# Patient Record
Sex: MALE | Race: ASIAN | NOT HISPANIC OR LATINO | Employment: FULL TIME | URBAN - METROPOLITAN AREA
[De-identification: names, ages, dates, MRNs, and addresses within clinical notes are randomized per-mention and may not be internally consistent; named-entity substitution may affect disease eponyms.]

---

## 2019-11-18 ENCOUNTER — RECORDS - HEALTHEAST (OUTPATIENT)
Dept: GENERAL RADIOLOGY | Facility: CLINIC | Age: 24
End: 2019-11-18

## 2019-11-18 ENCOUNTER — OFFICE VISIT - HEALTHEAST (OUTPATIENT)
Dept: FAMILY MEDICINE | Facility: CLINIC | Age: 24
End: 2019-11-18

## 2019-11-18 DIAGNOSIS — Z71.6 ENCOUNTER FOR TOBACCO USE CESSATION COUNSELING: ICD-10-CM

## 2019-11-18 DIAGNOSIS — M54.50 LOW BACK PAIN: ICD-10-CM

## 2019-11-18 DIAGNOSIS — M54.50 LOW BACK PAIN, UNSPECIFIED BACK PAIN LATERALITY, UNSPECIFIED CHRONICITY, UNSPECIFIED WHETHER SCIATICA PRESENT: ICD-10-CM

## 2019-11-18 ASSESSMENT — MIFFLIN-ST. JEOR: SCORE: 1480.68

## 2019-12-04 ENCOUNTER — OFFICE VISIT - HEALTHEAST (OUTPATIENT)
Dept: PHYSICAL THERAPY | Facility: REHABILITATION | Age: 24
End: 2019-12-04

## 2019-12-04 DIAGNOSIS — R29.3 POOR POSTURE: ICD-10-CM

## 2019-12-04 DIAGNOSIS — M62.81 MUSCLE WEAKNESS (GENERALIZED): ICD-10-CM

## 2019-12-04 DIAGNOSIS — G89.29 CHRONIC BILATERAL LOW BACK PAIN WITHOUT SCIATICA: ICD-10-CM

## 2019-12-04 DIAGNOSIS — M54.50 CHRONIC BILATERAL LOW BACK PAIN WITHOUT SCIATICA: ICD-10-CM

## 2019-12-18 ENCOUNTER — OFFICE VISIT - HEALTHEAST (OUTPATIENT)
Dept: PHYSICAL THERAPY | Facility: REHABILITATION | Age: 24
End: 2019-12-18

## 2019-12-18 DIAGNOSIS — M62.81 MUSCLE WEAKNESS (GENERALIZED): ICD-10-CM

## 2019-12-18 DIAGNOSIS — R29.3 POOR POSTURE: ICD-10-CM

## 2019-12-18 DIAGNOSIS — M54.50 CHRONIC BILATERAL LOW BACK PAIN WITHOUT SCIATICA: ICD-10-CM

## 2019-12-18 DIAGNOSIS — G89.29 CHRONIC BILATERAL LOW BACK PAIN WITHOUT SCIATICA: ICD-10-CM

## 2021-06-03 NOTE — PROGRESS NOTES
"Subjective: This patient comes in for the first time to the clinic.    He had been seen at [facility] years ago.    Came to [place] in August 2015, to [place].  From [place].    Past medical history no surgeries or hospitalizations.  Denies any medical issues    He has no allergies is not on any medicines has not tried anything over-the-counter for his back pain please see below    He smokes 3 to 4 cigarettes a day alcohol about 3 drinks per week.    He works on a forklift.  Denies any injuries at work he is  has 3 kids    The symptoms been going on in his lower back in the paraspinal muscles for about 6 to 7 months he does not have any leg symptoms denies any saddle anesthesia or urinary or bowel incontinence.    Is a first time he is been evaluated for this.  He is not been doing any exercises.    Tobacco status: He  reports that he has been smoking cigarettes. He has never used smokeless tobacco.    There are no active problems to display for this patient.      Current Outpatient Medications   Medication Sig Dispense Refill     naproxen (NAPROSYN) 375 MG tablet 1 po two times a day prn back pain 40 tablet 1     No current facility-administered medications for this visit.        ROS: 10 point review of systems positive as outlined above otherwise negative    Objective:    /72 (Patient Site: Right Arm, Patient Position: Sitting, Cuff Size: Adult Regular)   Pulse 81   Temp 97.5  F (36.4  C) (Oral)   Resp 16   Ht 5' 4\" (1.626 m)   Wt 130 lb (59 kg)   SpO2 98%   BMI 22.31 kg/m    Body mass index is 22.31 kg/m .    General appearance no acute distress    Vital signs are stable  HEENT negative cervical neck without tenderness no adenopathy  Lungs are clear no rales or rhonchi heart was regular S1-S2 rate at 80 O2 sat 98%    Abdomen nontender no guarding or rebound no flank pain.    Lower back with paraspinal tenderness, no radicular component no pain in through " the SI joints or sciatic notch.  Negative straight leg raising.    Upper and lower extremities had slightly brisk reflexes but symmetric.    No muscle wasting.    X-ray lumbar spine was normal other than some straightening of the normal curvature    No results found for this or any previous visit.    Assessment:  1. Low back pain, unspecified back pain laterality, unspecified chronicity, unspecified whether sciatica present  naproxen (NAPROSYN) 375 MG tablet    XR Lumbar Spine 2 or 3 VWS    Ambulatory referral to PT/OT   2. Encounter for tobacco use cessation counseling       Low back pain now for 7 months.    We will have him go to physical therapy also use naproxen 3 and 75 mg twice daily    Discussed Jonn exercise stretching.  As well.    Follow-up if any persistent symptoms after therapy    Plan: As outlined above    Also encouraged to quit smoking    This transcription uses voice recognition software, which may contain typographical errors.

## 2021-06-04 VITALS
SYSTOLIC BLOOD PRESSURE: 110 MMHG | HEART RATE: 81 BPM | HEIGHT: 64 IN | OXYGEN SATURATION: 98 % | TEMPERATURE: 97.5 F | WEIGHT: 130 LBS | DIASTOLIC BLOOD PRESSURE: 72 MMHG | RESPIRATION RATE: 16 BRPM | BODY MASS INDEX: 22.2 KG/M2

## 2021-06-04 NOTE — PROGRESS NOTES
[facility] Rehabilitation Certification Request    December 4, 2019      Patient: Law Umberto Pascual  MR Number: [id number]  YOB: 1995  Date of Visit: 12/4/2019      Dear Dr. Mccarty, Glenn Coats MD:    Thank you for this referral.   We are seeing Law Pascual in Physical Therapy for low back pain.    Medicare and/or Medicaid requires physician review and approval of the treatment plan. Please review the plan of care and verify that you agree with the therapy plan of care by co-signing this note.      Plan of Care  Authorization / Certification Start Date: 12/04/19  Authorization / Certification End Date: 03/03/20  Authorization / Certification Number of Visits: 8  Communication with: Referral Source  Patient Related Instruction: Nature of Condition;Treatment plan and rationale;Self Care instruction;Basis of treatment;Body mechanics  Times per Week: 1 to every other week  Number of Weeks: 12  Number of Visits: 4-8  Discharge Planning: Patient will be discharged when independent in self-management of condition or when goals are met.  Precautions / Restrictions : None  Therapeutic Exercise: ROM;Stretching;Strengthening  Neuromuscular Reeducation: kinesio tape;posture;core  Manual Therapy: soft tissue mobilization;myofascial release;joint mobilization;muscle energy;strain counterstrain  Modalities: electrical stimulation;ultrasound      Goals:  Pt. 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    Sincerely,      Echo Smith, PT, DPT        Physician recommendation:     ___ Follow therapist's recommendation        ___ Modify therapy      *Physician co-signature indicates they certify the need for these services furnished within this plan and while under their  [facility] Rehabilitation   [place]-Pelvic Initial Evaluation    Patient Name: Law Umberto Pascual  Date of evaluation: 12/4/2019  Referral Diagnosis: Low back pain, unspecified back pain laterality, unspecified chronicity, unspecified whether sciatica present  Referring provider: Glenn Mccaryt MD  Visit Diagnosis:     ICD-10-CM    1. Chronic bilateral low back pain without sciatica M54.5     G89.29    2. Muscle weakness (generalized) M62.81    3. Poor posture R29.3        Assessment:        Law Umberto Pascual is a 23 y.o. male who presents to therapy today with chief complaints of chronic lower back pain. Onset date of sx was 03/2019.  Pt reported h/o no known injury, but has had intermittent lower back pain in the past.  Pain symptoms are achy in nature.  Functional impairments include sitting and driving the forklift at work.  Pt demo's signs and sx consistent with chronic LBP likely d/t impaired mm strength and flexibility.   Pt. is appropriate for skilled PT intervention as outlined in the Plan of Care (POC).  Pt. is a good candidate for skilled PT services to improve pain levels and function.    Goals:  Pt. 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    Barriers to Learning or Achieving Goals:  Language barriers.       Plan / Patient Instructions:        Plan of Care:   Authorization / Certification Start Date: 12/04/19  Authorization / Certification End Date: 03/03/20  Authorization / Certification Number of Visits: 8  Communication with: Referral Source  Patient Related Instruction: Nature of Condition;Treatment plan and rationale;Self Care instruction;Basis of treatment;Body mechanics  Times per Week: 1 to every other week  Number of Weeks: 12  Number of Visits: 4-8  Discharge Planning:  Patient will be discharged when independent in self-management of condition or when goals are met.  Precautions / Restrictions : None  Therapeutic Exercise: ROM;Stretching;Strengthening  Neuromuscular Reeducation: kinesio tape;posture;core  Manual Therapy: soft tissue mobilization;myofascial release;joint mobilization;muscle energy;strain counterstrain  Modalities: electrical stimulation;ultrasound      POC and pathology of condition were reviewed with patient.  Pt. is in agreement with the Plan of Care  A Home Exercise Program (HEP) was initiated today.  Pt. was instructed in exercises by PT and patient was given a handout with detailed instructions.    Plan for next visit: Review of HEP.  Progress HEP as able.  Assess if KT tape was helpful and re-apply if it was.     Subjective:       The patient reports that his pain started 7-8 months ago.  He has had pain longer than this but it comes and goes.  Sitting can be painful.    Social information:   Living Situation:apartment   Occupation: [employer]   Work Status:Working full time   Equipment Available: None    Pain Ratin  Pain rating at best: 2  Pain rating at worst: 5  Pain description: aching    Functional limitations are described as occurring with:   sitting, driving forklift    Patient reports benefit from:  movement or exercise        Objective:      Note: Items left blank indicates the item was not performed or not indicated at the time of the evaluation.    Examination  1. Chronic bilateral low back pain without sciatica     2. Muscle weakness (generalized)     3. Poor posture       Involved side: Bilateral  Posture Observation:      General standing posture is fair.  Lumbopelvic complex: Mildly increased lumbar lordosis    Lumbar ROM:    Date: 19     *Indicate scale AROM AROM AROM   Lumbar Flexion WNL     Lumbar Extension WNL      Right Left Right Left Right Left   Lumbar Sidebending WNL WNL       Lumbar Rotation WNL WNL       Thoracic Flexion WNL      Thoracic Extension WNL     Thoracic Sidebending WNL WNL       Thoracic Rotation WNL WNL         Lower Extremity Strength:     Date: 12/04/19     LE strength/5 Right Left Right Left Right Left   Hip Flexion (L1-3) 4+ 4+       Hip Extension (L5-S1)         Hip Abduction (L4-5)         Hip Adduction (L2-3)         Hip External Rotation         Hip Internal Rotation         Knee Extension (L3-4) 4+ 4+       Knee Flexion 4 4       Ankle Dorsiflexion (L4-5) 5 5       Great Toe Extension (L5)         Ankle Plantar flexion (S1)         Abdominals        Sensation    WNL per patient    Reflex Testing  Lumbar Dermatomes Right Left UE Reflexes Right Left   Iliac Crest and Groin (L1)   Biceps (C5-6)     Anterior Medial Thigh (L2)   Brachioradialis (C5-6)     Anterior Thigh, Medial Epicondyle Femur (L3)   Triceps (C7-8)     Lateral Thigh, Anterior Knee, Medial Leg/Malleolus (L4)   Lena s test     Lateral Leg, Dorsal Foot (L5)   LE Reflexes     Lateral Foot (S1)   Patellar (L3-4)     Posterior Leg (S2)   Achilles (S1-2)     Other:   Babinski Response       Palpation: Increase muscle tone bilateral lumbar paraspinals    Lumbar Special Tests:     Lumbar Special Tests Right Left SI Tests Right  Left   Quadrant test   SI Compression     Straight leg raise + d/t hamstring tightness + d/t hamstring tightness SI Distraction     Crossover response   POSH Test     Slump   Sacral Thrust - -   Sit-up test  FADIR     Trunk extensor endurance test  Resisted Abduction     Prone instability test  Other:     Pubic shotgun  Other:       Repeated Motion Testing:  Not indicated    Passive Mobility - Joint Integrity:  WNL  Very mild hypomobility L4-5    LE Screen:  WNL    Appt time: 10:31AM - 11:25AM    Treatment Today     TREATMENT MINUTES COMMENTS   Evaluation 26 -Low complexity lumbar evaluation  -Patient educated on pathology  -Discussed POC   Self-care/ Home management     Manual therapy     Neuromuscular Re-education 8 -KT tape to  "lumbar paraspinal mm for inhibition; 2 I strips placed while patient was bent forward slightly  -Explanation of purpose of KT tape and instruction given how to remove tape after 3-5 days of use.   Therapeutic Activity     Therapeutic Exercises 20 -Demo/performance of HEP  Exercise #1: KTC stretch  Comment #1: Double leg x 30 seconds  Exercise #2: Hamstring stretch  Comment #2: Supine x 30 seconds bilaterally  Exercise #3: Ab sets/marching  Comment #3: x 15 bilaterally; cues for keeping pelvis level  Exercise #4: Bridging  Comment #4: x 10 with 5\" hold; cues to lift higher  Exercise #5: Quadruped exercise  Comment #5: Leg extensions x 5 bilaterally     Gait training     Modality__________________                Total 54    Blank areas are intentional and mean the treatment did not include these items.     PT Evaluation Code: (Please list factors)  Patient History/Comorbidities: None  Examination: Impaired ROM d/t mm tightness, increased mm tone d/t weakness lumbar spine  Clinical Presentation: Stable  Clinical Decision Making: Low complexity    Patient History/  Comorbidities Examination  (body structures and functions, activity limitations, and/or participation restrictions) Clinical Presentation Clinical Decision Making (Complexity)   No documented Comorbidities or personal factors 1-2 Elements Stable and/or uncomplicated Low   1-2 documented comorbidities or personal factor 3 Elements Evolving clinical presentation with changing characteristics Moderate   3-4 documented comorbidities or personal factors 4 or more Unstable and unpredictable High            Echo Smith, PT, DPT  12/4/2019  11:47 AM                 "

## 2021-06-04 NOTE — PROGRESS NOTES
"[facility] Daily Progress     Patient Name: Law Umberto Benderh  Date: 2019  Visit #:2  PTA visit #:  1  Referral Diagnosis: Chronic low back pain without sciatica  Referring provider: Yuan Montejo MD  Visit Diagnosis:     ICD-10-CM    1. Chronic bilateral low back pain without sciatica M54.5     G89.29    2. Muscle weakness (generalized) M62.81    3. Poor posture R29.3          Assessment:   Pt returns today for his first follow up appointment.   Pt with decreased pain today.   Pt reporting increased tolerance to work related duties.  Pt presents with poor sitting posture can correct with verbal cuing.  Pt requiring verbal and tactile cues to perform the exercises correctly.  Patient is benefitting from skilled physical therapy and is making steady progress toward functional goals.  Patient is appropriate to continue with skilled physical therapy intervention, as indicated by initial plan of care.    Goal Status:On going  Pt. 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  Progress with home program as tolerated.   Go over HEP. Discussed walking program with pt, taking short walks through out the day.  Progress to [facility].    Subjective:   Pt reports his back is feeling better.  Pt reports has been compliant with his HEP.  Pt does have a gym membership.  Pt not sure if the Kinesiotape helped.   Pain Ratin        Objective:     Exercises:  Exercise #1: KTC stretch  Comment #1: Double leg x 30 seconds  Exercise #2: Hamstring stretch  Comment #2: Supine x 30 seconds bilaterally  Exercise #3: Ab sets/marching  Comment #3: x 15 bilaterally; cues for keeping pelvis level  Exercise #4: Bridging  Comment #4: x 10 with 5\" hold; cues to lift higher  Exercise #5: Quadruped " "exercise  Comment #5: Leg extensions x 5 bilaterally  Exercise #6: clamshell  Comment #6: x10 B to gradually increase to 30 reps  Exercise #7: LTR  Comment #7: 5-10\" holds x 10 reps  Exercise #8: quadraped alternating arm raises  Comment #8: x10 reps         Treatment Today     TREATMENT MINUTES COMMENTS   Evaluation     Self-care/ Home management     Manual therapy     Neuromuscular Re-education     Therapeutic Activity     Therapeutic Exercises 30 Nustep WL 5 x 5'  See flow sheet or above  Added to HEP:  -LTR  -genie  -quadraped arm raises   Gait training     Modality__________________                Total 30    Blank areas are intentional and mean the treatment did not include these items.       Love Valenzuela,ISIDRO  12/18/2019    "

## 2021-06-06 NOTE — PROGRESS NOTES
[facility] Rehabilitation Discharge Summary  Patient Name: Law Shipman Samara  Date: 3/17/2020  Referral Diagnosis: Low back pain, unspecified back pain laterality, unspecified chronicity, unspecified whether sciatica present  Referring provider: Glenn Mccarty MD  Visit Diagnosis:     ICD-10-CM    1. Chronic bilateral low back pain without sciatica  M54.5     G89.29    2. Muscle weakness (generalized)  M62.81    3. Poor posture  R29.3        Goals:  Pt. 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  The patient attended therapy initially, but did not finish the therapy sessions prescribed.  Goals were not fully achieved. Explanation for goals not achieved: Did not return to PT.  The patient discontinued therapy, did not return.  No further therapy is required at this time.  The patient NS his last visit and has not returned. He is appropriate for DC from skilled PT services at this time.    Home exercise program given to patient:  Exercise #1: KTC stretch  Comment #1: Double leg HEP  Exercise #2: Hamstring stretch  Comment #2: Supine HEP  Exercise #3: Ab sets/marching  Comment #3: HEP  Exercise #4: Bridging  Comment #4: HEP  Exercise #5: Quadruped exercise  Comment #5: Leg extensions HEP  Exercise #6: clamshell  Comment #6: HEP  Exercise #7: LTR  Comment #7: HEP  Exercise #8: quadraped alternating arm raises  Comment #8: HEP      Therapy will be discontinued at this time.  The patient will need a new referral to resume.    Thank you for your referral.  Echo Smith, PT, DPT  3/17/2020  1:04 PM

## 2021-06-17 NOTE — PATIENT INSTRUCTIONS - HE
"Patient Instructions by Echo Smith PT at 12/4/2019 10:30 AM     Author: Echo Smith PT Service: -- Author Type: Physical Therapist    Filed: 12/4/2019 11:24 AM Encounter Date: 12/4/2019 Status: Signed    : Echo Smith PT (Physical Therapist)        DOUBLE KNEE TO CHEST STRETCH - DKTC    While Lying on your back,  hold your knees and gently pull them up towards your chest.    Hold 30 seconds x 2         HAMSTRING STRETCH - SUPINE    While lying on your back, raise up your leg and hold the back of your knee until a stretch is felt.    Hold 30 seconds x 2 each side      ABDOMINAL BRACING    While lying on your back, tighten your stomach muscles as you draw your navel down towards the floor.    Hold 5 seconds  x10-15 repetitions  1-2 sets    BRACE MARCHING    While lying on your back with your knees bent,  slowly raise up one foot a few inches and then set it back down.  Next, perform on your other leg.  Use your stomach muscles to keep your spine from moving.    x10-15 repetitions  1-2 sets      BRIDGING    While lying on your back, tighten your lower abdominals, squeeze your buttocks and then raise your buttocks off the floor/bed as creating a \"Bridge\" with your body.    Hold 5 seconds  x10-15 repetitions  1-2 sets      QUADRUPED LEG LIFT    Start on hands and knees while keeping your spine flat and neutral.  Tighten abdominal muscles.  Raise leg back and hold 3-5 seconds.  Return to original position and alternate legs.    x10-15 repetitions  1-2 sets                  "

## 2021-06-17 NOTE — PATIENT INSTRUCTIONS - HE
Patient Instructions by Love Cardona PTA at 12/18/2019  9:00 AM     Author: Love Cardona PTA Service: -- Author Type: Physical Therapist Assistant    Filed: 12/18/2019  9:26 AM Encounter Date: 12/18/2019 Status: Signed    : Love Cardona PTA (Physical Therapist Assistant)        CLAM SHELLS    While lying on your side with your knees bent, draw up the top knee while keeping contact of your feet together.    Do not let your pelvis roll back during the lifting movement.        LOWER TRUNK ROTATIONS - LTR    Lying on your back with your knees bent, gently move your knees side-to-side.    SINGLE KNEE TO CHEST STRETCH - SKTC    While lying on your back, use your hands and gently draw up a knee towards your chest.     Alternate:      DOUBLE KNEE TO CHEST STRETCH - DKTC    While Lying on your back,  hold your knees and gently pull them up towards your chest.

## 2021-06-22 ENCOUNTER — AMBULATORY - HEALTHEAST (OUTPATIENT)
Dept: NURSING | Facility: CLINIC | Age: 26
End: 2021-06-22

## 2021-06-24 ENCOUNTER — COMMUNICATION - HEALTHEAST (OUTPATIENT)
Dept: FAMILY MEDICINE | Facility: CLINIC | Age: 26
End: 2021-06-24

## 2021-06-26 NOTE — TELEPHONE ENCOUNTER
Spoke with patient and relay test results.    ----- Message from Gwen Peres DO sent at 6/24/2021  5:55 AM CDT -----  Please call patient and inform:  All of his tests are negative or normal. He has no infections.

## 2021-07-13 ENCOUNTER — IMMUNIZATION (OUTPATIENT)
Dept: NURSING | Facility: CLINIC | Age: 26
End: 2021-07-13
Payer: COMMERCIAL

## 2021-07-13 PROCEDURE — 0002A PR COVID VAC PFIZER DIL RECON 30 MCG/0.3 ML IM: CPT

## 2021-07-13 PROCEDURE — 91300 PR COVID VAC PFIZER DIL RECON 30 MCG/0.3 ML IM: CPT

## 2022-06-15 ENCOUNTER — OFFICE VISIT (OUTPATIENT)
Dept: FAMILY MEDICINE | Facility: CLINIC | Age: 27
End: 2022-06-15
Payer: COMMERCIAL

## 2022-06-15 VITALS
HEART RATE: 74 BPM | WEIGHT: 125 LBS | HEIGHT: 64 IN | OXYGEN SATURATION: 97 % | SYSTOLIC BLOOD PRESSURE: 104 MMHG | BODY MASS INDEX: 21.34 KG/M2 | TEMPERATURE: 97.6 F | DIASTOLIC BLOOD PRESSURE: 68 MMHG

## 2022-06-15 DIAGNOSIS — M54.50 LOW BACK PAIN, UNSPECIFIED BACK PAIN LATERALITY, UNSPECIFIED CHRONICITY, UNSPECIFIED WHETHER SCIATICA PRESENT: ICD-10-CM

## 2022-06-15 DIAGNOSIS — R07.89 CHEST WALL PAIN: ICD-10-CM

## 2022-06-15 DIAGNOSIS — Z00.00 ROUTINE HISTORY AND PHYSICAL EXAMINATION OF ADULT: Primary | ICD-10-CM

## 2022-06-15 LAB
ALBUMIN SERPL-MCNC: 4.1 G/DL (ref 3.5–5)
ALP SERPL-CCNC: 84 U/L (ref 45–120)
ALT SERPL W P-5'-P-CCNC: 10 U/L (ref 0–45)
ANION GAP SERPL CALCULATED.3IONS-SCNC: 11 MMOL/L (ref 5–18)
AST SERPL W P-5'-P-CCNC: 14 U/L (ref 0–40)
BILIRUB SERPL-MCNC: 0.8 MG/DL (ref 0–1)
BUN SERPL-MCNC: 19 MG/DL (ref 8–22)
CALCIUM SERPL-MCNC: 9.7 MG/DL (ref 8.5–10.5)
CHLORIDE BLD-SCNC: 101 MMOL/L (ref 98–107)
CHOLEST SERPL-MCNC: 209 MG/DL
CO2 SERPL-SCNC: 27 MMOL/L (ref 22–31)
CREAT SERPL-MCNC: 1.06 MG/DL (ref 0.7–1.3)
FASTING STATUS PATIENT QL REPORTED: NO
GFR SERPL CREATININE-BSD FRML MDRD: >90 ML/MIN/1.73M2
GLUCOSE BLD-MCNC: 86 MG/DL (ref 70–125)
HDLC SERPL-MCNC: 62 MG/DL
LDLC SERPL CALC-MCNC: 132 MG/DL
POTASSIUM BLD-SCNC: 3.9 MMOL/L (ref 3.5–5)
PROT SERPL-MCNC: 7.6 G/DL (ref 6–8)
SODIUM SERPL-SCNC: 139 MMOL/L (ref 136–145)
TRIGL SERPL-MCNC: 73 MG/DL

## 2022-06-15 PROCEDURE — 80061 LIPID PANEL: CPT | Performed by: FAMILY MEDICINE

## 2022-06-15 PROCEDURE — 80053 COMPREHEN METABOLIC PANEL: CPT | Performed by: FAMILY MEDICINE

## 2022-06-15 PROCEDURE — 99395 PREV VISIT EST AGE 18-39: CPT | Performed by: FAMILY MEDICINE

## 2022-06-15 PROCEDURE — 36415 COLL VENOUS BLD VENIPUNCTURE: CPT | Performed by: FAMILY MEDICINE

## 2022-06-15 ASSESSMENT — ENCOUNTER SYMPTOMS
HEMATOCHEZIA: 0
HEARTBURN: 0
FREQUENCY: 0
NERVOUS/ANXIOUS: 0
DIZZINESS: 0
NAUSEA: 0
JOINT SWELLING: 0
FEVER: 0
CHILLS: 0
CONSTIPATION: 0
EYE PAIN: 0
SORE THROAT: 0
DIARRHEA: 0
ARTHRALGIAS: 0
DYSURIA: 0
SHORTNESS OF BREATH: 0
MYALGIAS: 0
PARESTHESIAS: 0
HEMATURIA: 0
ABDOMINAL PAIN: 1
COUGH: 0
PALPITATIONS: 0
WEAKNESS: 0
HEADACHES: 0

## 2022-06-15 NOTE — LETTER
June 16, 2022      Law Umberto Pascual  [address] [place] [address]        Dear ,    We are writing to inform you of your test results.    Your test results fall within the expected range(s) or remain unchanged from previous results.  Please continue with current treatment plan.    Resulted Orders   Comprehensive metabolic panel (BMP + Alb, Alk Phos, ALT, AST, Total. Bili, TP)   Result Value Ref Range    Sodium 139 136 - 145 mmol/L    Potassium 3.9 3.5 - 5.0 mmol/L    Chloride 101 98 - 107 mmol/L    Carbon Dioxide (CO2) 27 22 - 31 mmol/L    Anion Gap 11 5 - 18 mmol/L    Urea Nitrogen 19 8 - 22 mg/dL    Creatinine 1.06 0.70 - 1.30 mg/dL    Calcium 9.7 8.5 - 10.5 mg/dL    Glucose 86 70 - 125 mg/dL    Alkaline Phosphatase 84 45 - 120 U/L    AST 14 0 - 40 U/L    ALT 10 0 - 45 U/L    Protein Total 7.6 6.0 - 8.0 g/dL    Albumin 4.1 3.5 - 5.0 g/dL    Bilirubin Total 0.8 0.0 - 1.0 mg/dL    GFR Estimate >90 >60 mL/min/1.73m2      Comment:      Effective December 21, 2021 eGFRcr in adults is calculated using the 2021 CKD-EPI creatinine equation which includes age and gender (Ciara et al., NEJM, DOI: 10.1056/MIWZnh4283454)   Lipid panel reflex to direct LDL Non-fasting   Result Value Ref Range    Cholesterol 209 (H) <=199 mg/dL    Triglycerides 73 <=149 mg/dL    Direct Measure HDL 62 >=40 mg/dL      Comment:      HDL Cholesterol Reference Range:     0-2 years:   No reference ranges established for patients under 2 years old  at [facility] for lipid analytes.    2-8 years:  Greater than 45 mg/dL     18 years and older:   Female: Greater than or equal to 50 mg/dL   Male:   Greater than or equal to 40 mg/dL    LDL Cholesterol Calculated 132 (H) <=129 mg/dL    Patient Fasting > 8hrs? No        If you have any questions or concerns, please call the clinic at the number listed above.       Sincerely,      Yuan Montejo MD

## 2022-06-15 NOTE — PROGRESS NOTES
SUBJECTIVE:   CC: Law Umberto Pascual is an 26 year old male who presents for preventative health visit.       Patient has been advised of split billing requirements and indicates understanding: Yes  HPI  A 26-year-old male here for annual physical he is not fasting he is noticed right-sided chest wall pain is been present for many months and the pain radiates into his right arm from the right side of his chest it does not spread anywhere else.  He states the pain is not exacerbated or accelerated by exercise he does not notice any shortness of breath when the pain comes it lasts all day.  It does not awaken him from sleep.  Denies any previous injuries or trauma has never had this pain before      Today's PHQ-2 Score:   PHQ-2 ( 1999 [employer]) 6/15/2022   Q1: Little interest or pleasure in doing things 0   Q2: Feeling down, depressed or hopeless 0   PHQ-2 Score 0   Q1: Little interest or pleasure in doing things Not at all   Q2: Feeling down, depressed or hopeless Not at all   PHQ-2 Score 0       Abuse: Current or Past(Physical, Sexual or Emotional)- No  Do you feel safe in your environment? Yes    Have you ever done Advance Care Planning? (For example, a Health Directive, POLST, or a discussion with a medical provider or your loved ones about your wishes): No, advance care planning information given to patient to review.  Patient plans to discuss their wishes with loved ones or provider.      Social History     Tobacco Use     Smoking status: Current Every Day Smoker     Types: Cigarettes     Smokeless tobacco: Never Used     Tobacco comment: 4-5 cigars. per day   Substance Use Topics     Alcohol use: Not on file         Alcohol Use 6/15/2022   Prescreen: >3 drinks/day or >7 drinks/week? No       Last PSA: No results found for: PSA    Reviewed orders with patient. Reviewed health maintenance and updated orders accordingly - Yes  Lab work is in process    Reviewed and updated as needed this visit by clinical staff   Tobacco   "Allergies  Meds                Reviewed and updated as needed this visit by Provider                       Review of Systems  CONSTITUTIONAL: NEGATIVE for fever, chills, change in weight  INTEGUMENTARY/SKIN: NEGATIVE for worrisome rashes, moles or lesions  EYES: NEGATIVE for vision changes or irritation  ENT: NEGATIVE for ear, mouth and throat problems  RESP: NEGATIVE for significant cough or SOB  CV: chest pain/pressure  GI: NEGATIVE for nausea, abdominal pain, heartburn, or change in bowel habits   male: negative for dysuria, hematuria, decreased urinary stream, erectile dysfunction, urethral discharge  MUSCULOSKELETAL: NEGATIVE for significant arthralgias or myalgia  NEURO: NEGATIVE for weakness, dizziness or paresthesias  PSYCHIATRIC: NEGATIVE for changes in mood or affect    OBJECTIVE:   Ht 1.626 m (5' 4\")   Wt 56.7 kg (125 lb)   BMI 21.46 kg/m      Physical Exam  GENERAL: healthy, alert and no distress  EYES: Eyes grossly normal to inspection, PERRL and conjunctivae and sclerae normal  HENT: ear canals and TM's normal, nose and mouth without ulcers or lesions  NECK: no adenopathy, no asymmetry, masses, or scars and thyroid normal to palpation  RESP: lungs clear to auscultation - no rales, rhonchi or wheezes  CV: regular rate and rhythm, normal S1 S2, no S3 or S4, no murmur, click or rub, no peripheral edema and peripheral pulses strong  ABDOMEN: soft, nontender, no hepatosplenomegaly, no masses and bowel sounds normal  MS: no gross musculoskeletal defects noted, no edema  SKIN: no suspicious lesions or rashes  NEURO: Normal strength and tone, mentation intact and speech normal  PSYCH: mentation appears normal, affect normal/bright    Diagnostic Test Results:  Labs reviewed in Epic    ASSESSMENT/PLAN:       ICD-10-CM    1. Routine history and physical examination of adult  Z00.00 Comprehensive metabolic panel (BMP + Alb, Alk Phos, ALT, AST, Total. Bili, TP)     Lipid panel reflex to direct LDL Non-fasting " "    Comprehensive metabolic panel (BMP + Alb, Alk Phos, ALT, AST, Total. Bili, TP)     Lipid panel reflex to direct LDL Non-fasting   2. Chest wall pain given the nature of his pain I have prescribed Naprosyn this is a musculoskeletal pain that was reproducible when I touched right pectoralis major area there is no discomfort noted over the sternum.  No evidence of contusion. R07.89    3. Low back pain, unspecified back pain laterality, unspecified chronicity, unspecified whether sciatica present  M54.50 naproxen (NAPROSYN) 375 MG tablet         COUNSELING:   Reviewed preventive health counseling, as reflected in patient instructions       Regular exercise       Healthy diet/nutrition    Estimated body mass index is 21.46 kg/m  as calculated from the following:    Height as of this encounter: 1.626 m (5' 4\").    Weight as of this encounter: 56.7 kg (125 lb).         He reports that he has been smoking cigarettes. He has never used smokeless tobacco.  Tobacco Cessation Action Plan:   Information offered: Patient not interested at this time      Counseling Resources:  ATP IV Guidelines  Pooled Cohorts Equation Calculator  FRAX Risk Assessment  ICSI Preventive Guidelines  Dietary Guidelines for Americans, 2010  USDA's MyPlate  ASA Prophylaxis  Lung CA Screening    Yuan Montejo MD  [facility]  "

## 2022-06-16 NOTE — RESULT ENCOUNTER NOTE
Please inform patient all lab test from yesterday's visit are in the normal range this includes his kidney and liver test and his cholesterol

## 2022-09-21 ENCOUNTER — OFFICE VISIT (OUTPATIENT)
Dept: FAMILY MEDICINE | Facility: CLINIC | Age: 27
End: 2022-09-21
Payer: MEDICAID

## 2022-09-21 VITALS
DIASTOLIC BLOOD PRESSURE: 84 MMHG | HEART RATE: 88 BPM | TEMPERATURE: 97.9 F | HEIGHT: 64 IN | RESPIRATION RATE: 16 BRPM | BODY MASS INDEX: 21.85 KG/M2 | OXYGEN SATURATION: 97 % | SYSTOLIC BLOOD PRESSURE: 110 MMHG | WEIGHT: 128 LBS

## 2022-09-21 DIAGNOSIS — Z23 ENCOUNTER FOR IMMUNIZATION: ICD-10-CM

## 2022-09-21 DIAGNOSIS — R07.89 CHEST WALL PAIN: Primary | ICD-10-CM

## 2022-09-21 DIAGNOSIS — F17.209 TOBACCO USE DISORDER, CONTINUOUS: ICD-10-CM

## 2022-09-21 DIAGNOSIS — M75.81 PECTORALIS MAJOR TENDINITIS, RIGHT: ICD-10-CM

## 2022-09-21 DIAGNOSIS — M54.50 LOW BACK PAIN, UNSPECIFIED BACK PAIN LATERALITY, UNSPECIFIED CHRONICITY, UNSPECIFIED WHETHER SCIATICA PRESENT: ICD-10-CM

## 2022-09-21 PROCEDURE — 99214 OFFICE O/P EST MOD 30 MIN: CPT | Mod: 25 | Performed by: FAMILY MEDICINE

## 2022-09-21 PROCEDURE — 90471 IMMUNIZATION ADMIN: CPT | Performed by: FAMILY MEDICINE

## 2022-09-21 PROCEDURE — 90677 PCV20 VACCINE IM: CPT | Performed by: FAMILY MEDICINE

## 2022-09-21 PROCEDURE — 90472 IMMUNIZATION ADMIN EACH ADD: CPT | Performed by: FAMILY MEDICINE

## 2022-09-21 PROCEDURE — 90686 IIV4 VACC NO PRSV 0.5 ML IM: CPT | Performed by: FAMILY MEDICINE

## 2022-09-21 NOTE — PROGRESS NOTES
Assessment & Plan     Chest wall pain  Patient continues to have chest wall pain this pain has been present for approximately 6 months it was not present in 2021 today a plain radiograph of the chest was conducted personal review of the radiograph revealed no evidence of a fracture   - XR Chest 2 Views; Future    Low back pain, unspecified back pain laterality, unspecified chronicity, unspecified whether sciatica present  Today he has no back pain he has not noticed any back pain after starting the naproxen he like to continue the medication  - naproxen (NAPROSYN) 375 MG tablet; [NAPROXEN (NAPROSYN) 375 MG TABLET] 1 po two times a day prn back pain    Encounter for immunization    Agrees for immunization today.    Pectoralis major tendinitis, right    Reproducible chest wall tenderness over the surface of the right pectoralis major muscle.  He has some referred tenderness into the right deltoid area.  I have given him some simple exercises he will use an ice pack if his pain persists for more than 2 weeks he is going to contact me again and I will send him to physical therapy.    Tobacco use disorder, continuous    He discussed his use of smoking and states that he would like to quit he says he can quit by himself he has been smoking for more than 5 years.  He asked me if smoking can cause lung disease and I did affirm this with him.  He will follow-up with me if he wants to use medications to help him stop    [id number]}         No follow-ups on file.    MD KALYN Velázquez [facility] JOSE DE JESUS Moreno is a 26 year old, presenting for the following health issues:  Chest Pain  Back pain  Questions regarding smoking    History of Present Illness       Reason for visit:  F/u chest pain comes and goes    He eats 2-3 servings of fruits and vegetables daily.He consumes 1 sweetened beverage(s) daily.He exercises with enough effort to increase his heart rate 9 or less minutes per day.  He exercises with  "enough effort to increase his heart rate 3 or less days per week.   He is taking medications regularly.             Review of Systems   Constitutional, HEENT, cardiovascular, pulmonary, gi and gu systems are negative, except as otherwise noted.      Objective    /84 (BP Location: Left arm)   Pulse 88   Temp 97.9  F (36.6  C) (Oral)   Resp 16   Ht 1.626 m (5' 4\")   Wt 58.1 kg (128 lb)   SpO2 97%   BMI 21.97 kg/m    Body mass index is 21.97 kg/m .  Physical Exam   GENERAL: healthy, alert and no distress  EYES: Eyes grossly normal to inspection, PERRL and conjunctivae and sclerae normal  NECK: no adenopathy, no asymmetry, masses, or scars and thyroid normal to palpation  RESP: lungs clear to auscultation - no rales, rhonchi or wheezes  CV: regular rate and rhythm, normal S1 S2, no S3 or S4, no murmur, click or rub, no peripheral edema and peripheral pulses strong  MS: tenderness to palpation   Right pectoralis major muscle  Range of motion in right shoulder is normal in abduction abduction and elevation  NEURO: Normal strength and tone, mentation intact and speech normal  PSYCH: mentation appears normal, affect normal/bright                "

## 2023-03-11 ENCOUNTER — APPOINTMENT (OUTPATIENT)
Dept: RADIOLOGY | Facility: HOSPITAL | Age: 28
End: 2023-03-11
Attending: EMERGENCY MEDICINE
Payer: COMMERCIAL

## 2023-03-11 ENCOUNTER — HOSPITAL ENCOUNTER (EMERGENCY)
Facility: HOSPITAL | Age: 28
Discharge: HOME | End: 2023-03-11
Attending: EMERGENCY MEDICINE | Admitting: EMERGENCY MEDICINE
Payer: COMMERCIAL

## 2023-03-11 VITALS
HEART RATE: 106 BPM | RESPIRATION RATE: 16 BRPM | OXYGEN SATURATION: 99 % | WEIGHT: 128 LBS | DIASTOLIC BLOOD PRESSURE: 76 MMHG | TEMPERATURE: 98.2 F | BODY MASS INDEX: 21.97 KG/M2 | SYSTOLIC BLOOD PRESSURE: 146 MMHG

## 2023-03-11 DIAGNOSIS — S82.891A ANKLE FRACTURE, RIGHT, CLOSED, INITIAL ENCOUNTER: ICD-10-CM

## 2023-03-11 PROCEDURE — 73590 X-RAY EXAM OF LOWER LEG: CPT | Mod: RT

## 2023-03-11 PROCEDURE — 73610 X-RAY EXAM OF ANKLE: CPT | Mod: RT

## 2023-03-11 PROCEDURE — 99285 EMERGENCY DEPT VISIT HI MDM: CPT

## 2023-03-11 PROCEDURE — 27786 TREATMENT OF ANKLE FRACTURE: CPT | Mod: RT

## 2023-03-11 PROCEDURE — 73560 X-RAY EXAM OF KNEE 1 OR 2: CPT | Mod: RT

## 2023-03-11 PROCEDURE — 250N000013 HC RX MED GY IP 250 OP 250 PS 637: Performed by: EMERGENCY MEDICINE

## 2023-03-11 RX ORDER — ACETAMINOPHEN 325 MG/1
975 TABLET ORAL ONCE
Status: COMPLETED | OUTPATIENT
Start: 2023-03-11 | End: 2023-03-11

## 2023-03-11 RX ORDER — IBUPROFEN 600 MG/1
600 TABLET, FILM COATED ORAL ONCE
Status: COMPLETED | OUTPATIENT
Start: 2023-03-11 | End: 2023-03-11

## 2023-03-11 RX ADMIN — ACETAMINOPHEN 975 MG: 325 TABLET ORAL at 17:58

## 2023-03-11 RX ADMIN — IBUPROFEN 600 MG: 600 TABLET, FILM COATED ORAL at 17:58

## 2023-03-11 ASSESSMENT — ACTIVITIES OF DAILY LIVING (ADL): ADLS_ACUITY_SCORE: 33

## 2023-03-11 NOTE — ED TRIAGE NOTES
Pt reports shoveling and falling onto his ankle. Right ankle is swollen but positive CMS. Denies blood thinners     Triage Assessment     Row Name 03/11/23 1700       Triage Assessment (Adult)    Airway WDL WDL       Respiratory WDL    Respiratory WDL WDL       Skin Circulation/Temperature WDL    Skin Circulation/Temperature WDL WDL       Cardiac WDL    Cardiac WDL WDL       Peripheral/Neurovascular WDL    Peripheral Neurovascular WDL WDL       Cognitive/Neuro/Behavioral WDL    Cognitive/Neuro/Behavioral WDL WDL

## 2023-03-11 NOTE — ED PROVIDER NOTES
EMERGENCY DEPARTMENT ENCOUNTER            IMPRESSION:  Ankle fracture      MEDICAL DECISION MAKING:  It was my pleasure to provide care for Law GEORGE Pascual who presented for evaluation of isolated ankle injury    Patient is pleasant and cooperative    On exam vital signs are normal.  He is slightly tachycardic.  Right lower extremity is tender and swollen.  He has difficulty weightbearing.  There is some superficial abrasion.  Tender along the lateral malleolus.  Achilles is intact    Symptomatic medications were provided    Imaging independently reviewed and agree with radiologist findings of nondisplaced distal fibula fracture    Long-leg fiberglass cast was placed.  He has crutches    Patient discharged for outpatient follow-up with orthopedics          =================================================================  CHIEF COMPLAINT:  Chief Complaint   Patient presents with     Trauma         HPI  Law GEORGE Pascual is a 27 year old male with a history of tobacco use disorder who presents to the ED by walk in for evaluation of leg injury.    The patient reports that his morning when he was shoveling snow, he slipped and fell onto his right ankle. Notes an abrasion to both his right ankle and leg with associated pain. Denies knee pain or any other complaints at this time.    I, Ashlie Ko am serving as a scribe to document services personally performed by Dr. Tr Batres MD, based on my observation and the provider's statements to me. I, Dr. Tr Batres MD attest that Ashlie Ko is acting in a scribe capacity, has observed my performance of the services and has documented them in accordance with my direction.      REVIEW OF SYSTEMS   Constitutional: Does not report chills, unintentional weight loss or fatigue   Eyes: Does not report visual changes or discharge    HENT: Does not report sore throat, ear pain or neck pain  Respiratory: Does not report cough or shortness of breath    Cardiovascular: Does not report chest  pain, palpitations or leg swelling  GI: Does not report abdominal pain, nausea, vomiting, or dark, bloody stools.    : Does not report hematuria, dysuria, or flank pain  Musculoskeletal: Does not report any new musculoskeletal pain or new muscle/joint pains  Skin: Does not report rash. Positive for abrasion to right ankle and leg.  Neurologic: Does not report current headache, new weakness, focal weakness, or sensory changes        Remainder of systems reviewed, unless noted in HPI all others negative.      PAST MEDICAL HISTORY:  History reviewed. No pertinent past medical history.    PAST SURGICAL HISTORY:  History reviewed. No pertinent surgical history.      CURRENT MEDICATIONS:    naproxen (NAPROSYN) 375 MG tablet        ALLERGIES:  No Known Allergies    FAMILY HISTORY:  History reviewed. No pertinent family history.    SOCIAL HISTORY:   Social History     Socioeconomic History     Marital status:    Tobacco Use     Smoking status: Every Day     Types: Cigarettes     Smokeless tobacco: Never     Tobacco comments:     4-5 cigars. per day       PHYSICAL EXAM:    BP (!) 146/76   Pulse 106   Temp 98.2  F (36.8  C) (Oral)   Resp 16   Wt 58.1 kg (128 lb)   SpO2 99%   BMI 21.97 kg/m      General Presentation: No apparent distress.  Head: Atraumatic    Lower extremities: The knee is normal to inspection with no tenderness.  There is no mid leg tenderness to compression.  There is lateral soft tissue swelling and tenderness along the distal fibula  Skin: Head/scalp non tender. No wounds, abrasions, lacerations, or contusions of head/scalp, chest, back, abdomen, flank or pelvis.       ED COURSE:    5:21 PM Met with and introduced myself to the patient. Disccused history and plan of care.  6:48 PM Rechecked and updated patient on imaging results. I splinted his leg. Discussed plan for discharge.     Procedure; long-leg fiberglass cast placed by myself    Medical Decision Making    History:    Supplemental  history from: None    External Record(s) reviewed: External medical records including care everywhere reviewed    Work Up:    Imaging studies independently reviewed by the provider      External consultation:    Discussion of management with another provider: None    Complicating factors:    Patient has a complicated past medical history including None    Care affected by social determinants of health: Access to primary care    Disposition considerations: Disposition involved in shared decision-making with the patient, patient instructed to continue outpatient medication as prescribed, referred for follow-up,              LAB:  Laboratory results were independently reviewed and interpreted  Results for orders placed or performed during the hospital encounter of 03/11/23   Ankle XR, G/E 3 views, right    Impression    IMPRESSION:   1.  Nondisplaced fracture of the fibula distal metaphysis.  2.  No additional fracture.  3.  Normal knee joint spacing and alignment. No joint effusion.  4.  Normal ankle joint spacing and alignment.  5.  Soft tissue swelling in the lateral ankle.    XR Tibia & Fibula Right 2 Views    Impression    IMPRESSION:   1.  Nondisplaced fracture of the fibula distal metaphysis.  2.  No additional fracture.  3.  Normal knee joint spacing and alignment. No joint effusion.  4.  Normal ankle joint spacing and alignment.  5.  Soft tissue swelling in the lateral ankle.    XR Knee Right 1/2 Views    Impression    IMPRESSION:   1.  Nondisplaced fracture of the fibula distal metaphysis.  2.  No additional fracture.  3.  Normal knee joint spacing and alignment. No joint effusion.  4.  Normal ankle joint spacing and alignment.  5.  Soft tissue swelling in the lateral ankle.          RADIOLOGY:  Radiology reports were independently reviewed and interpreted  XR Tibia & Fibula Right 2 Views   Final Result   IMPRESSION:    1.  Nondisplaced fracture of the fibula distal metaphysis.   2.  No additional fracture.    3.  Normal knee joint spacing and alignment. No joint effusion.   4.  Normal ankle joint spacing and alignment.   5.  Soft tissue swelling in the lateral ankle.       Ankle XR, G/E 3 views, right   Final Result   IMPRESSION:    1.  Nondisplaced fracture of the fibula distal metaphysis.   2.  No additional fracture.   3.  Normal knee joint spacing and alignment. No joint effusion.   4.  Normal ankle joint spacing and alignment.   5.  Soft tissue swelling in the lateral ankle.       XR Knee Right 1/2 Views   Final Result   IMPRESSION:    1.  Nondisplaced fracture of the fibula distal metaphysis.   2.  No additional fracture.   3.  Normal knee joint spacing and alignment. No joint effusion.   4.  Normal ankle joint spacing and alignment.   5.  Soft tissue swelling in the lateral ankle.              PROCEDURES:     PROCEDURE: Splint Placement   INDICATIONS: right leg fracture   PROCEDURE PROVIDER: Dr Tr Batres   NOTE:  A Long leg splint made of Orthoglass was applied to the Right lower extremity by the above provider. As noted in the physical exam, distal CMS was intact prior to placement. The splint was checked and the fit was adjusted to ensure proper positioning after placement. Sensation and circulation, as well as motor function, are unchanged after splint placement and the patient is more comfortable with the splint in place.         MEDICATIONS GIVEN IN THE EMERGENCY:  Medications   acetaminophen (TYLENOL) tablet 975 mg (975 mg Oral $Given 3/11/23 1758)   ibuprofen (ADVIL/MOTRIN) tablet 600 mg (600 mg Oral $Given 3/11/23 1758)           NEW PRESCRIPTIONS STARTED AT TODAY'S ER VISIT:  New Prescriptions    No medications on file          Prior to making a final disposition on this patient the results of patient's tests and other diagnostic studies were discussed with the patient. All questions were answered. Patient expressed understanding of the plan and was amenable to it.      FINAL DIAGNOSIS:    ICD-10-CM     1. Ankle fracture, right, closed, initial encounter  S82.891A                  NAME: Law GEORGE Pascual  AGE: 27 year old male  YOB: 1995  MRN: [id number]  EVALUATION DATE & TIME: No admission date for patient encounter.    PCP: Yuan Montejo    ED PROVIDER: Tr Batres M.D.      Ashlie HILLMAN, am serving as a scribe to document services personally performed by Dr. Tr Batres based on my observation and the provider's statements to me. ITr MD attest that Ashlie Ko is acting in a scribe capacity, has observed my performance of the services and has documented them in accordance with my direction.    Tr Batres M.D.  Emergency Medicine  [facility] EMERGENCY DEPARTMENT  [address] [place] [id number]  [phone]  Dept: [phone]  3/11/2023       Tr Batres MD  03/11/23 2320

## 2023-03-12 NOTE — DISCHARGE INSTRUCTIONS
You have a distal fibula fracture  Keep the splint in place, do not bear any weight, use the crutches   take the Tylenol and Motrin for pain  Call the above orthopedic provider for follow-up

## 2023-05-16 ENCOUNTER — PATIENT OUTREACH (OUTPATIENT)
Dept: CARE COORDINATION | Facility: CLINIC | Age: 28
End: 2023-05-16
Payer: COMMERCIAL

## 2023-05-31 ENCOUNTER — PATIENT OUTREACH (OUTPATIENT)
Dept: CARE COORDINATION | Facility: CLINIC | Age: 28
End: 2023-05-31
Payer: COMMERCIAL